# Patient Record
Sex: MALE | Race: ASIAN | NOT HISPANIC OR LATINO
[De-identification: names, ages, dates, MRNs, and addresses within clinical notes are randomized per-mention and may not be internally consistent; named-entity substitution may affect disease eponyms.]

---

## 2018-07-11 ENCOUNTER — APPOINTMENT (OUTPATIENT)
Dept: INTERNAL MEDICINE | Facility: CLINIC | Age: 43
End: 2018-07-11
Payer: COMMERCIAL

## 2018-07-11 VITALS
HEART RATE: 66 BPM | OXYGEN SATURATION: 99 % | RESPIRATION RATE: 17 BRPM | WEIGHT: 153 LBS | TEMPERATURE: 97.7 F | BODY MASS INDEX: 23.19 KG/M2 | HEIGHT: 68 IN | DIASTOLIC BLOOD PRESSURE: 80 MMHG | SYSTOLIC BLOOD PRESSURE: 128 MMHG

## 2018-07-11 DIAGNOSIS — Z82.61 FAMILY HISTORY OF ARTHRITIS: ICD-10-CM

## 2018-07-11 DIAGNOSIS — Z82.49 FAMILY HISTORY OF ISCHEMIC HEART DISEASE AND OTHER DISEASES OF THE CIRCULATORY SYSTEM: ICD-10-CM

## 2018-07-11 DIAGNOSIS — Z00.00 ENCOUNTER FOR GENERAL ADULT MEDICAL EXAMINATION W/OUT ABNORMAL FINDINGS: ICD-10-CM

## 2018-07-11 DIAGNOSIS — Z83.49 FAMILY HISTORY OF OTHER ENDOCRINE, NUTRITIONAL AND METABOLIC DISEASES: ICD-10-CM

## 2018-07-11 PROCEDURE — 99386 PREV VISIT NEW AGE 40-64: CPT

## 2018-07-11 RX ORDER — UBIDECARENONE/VIT E ACET 100MG-5
1000 CAPSULE ORAL
Refills: 0 | Status: ACTIVE | COMMUNITY

## 2018-07-11 RX ORDER — HYDROCHLOROTHIAZIDE 50 MG/1
50 TABLET ORAL
Refills: 0 | Status: ACTIVE | COMMUNITY

## 2018-07-13 LAB
25(OH)D3 SERPL-MCNC: 25.8 NG/ML
ALBUMIN SERPL ELPH-MCNC: 5.2 G/DL
ALP BLD-CCNC: 77 U/L
ALT SERPL-CCNC: 15 U/L
ANION GAP SERPL CALC-SCNC: 13 MMOL/L
APPEARANCE: CLEAR
AST SERPL-CCNC: 16 U/L
BASOPHILS # BLD AUTO: 0.02 K/UL
BASOPHILS NFR BLD AUTO: 0.3 %
BILIRUB SERPL-MCNC: 0.9 MG/DL
BILIRUBIN URINE: NEGATIVE
BLOOD URINE: NEGATIVE
BUN SERPL-MCNC: 11 MG/DL
CALCIUM SERPL-MCNC: 9.9 MG/DL
CHLORIDE SERPL-SCNC: 105 MMOL/L
CHOLEST SERPL-MCNC: 223 MG/DL
CHOLEST/HDLC SERPL: 4.1 RATIO
CO2 SERPL-SCNC: 26 MMOL/L
COLOR: YELLOW
CREAT SERPL-MCNC: 0.83 MG/DL
EOSINOPHIL # BLD AUTO: 0.05 K/UL
EOSINOPHIL NFR BLD AUTO: 0.7 %
GLUCOSE QUALITATIVE U: NEGATIVE MG/DL
GLUCOSE SERPL-MCNC: 85 MG/DL
HBA1C MFR BLD HPLC: 5.4 %
HCT VFR BLD CALC: 49.5 %
HDLC SERPL-MCNC: 55 MG/DL
HGB BLD-MCNC: 16.2 G/DL
IMM GRANULOCYTES NFR BLD AUTO: 0.3 %
KETONES URINE: NEGATIVE
LDLC SERPL CALC-MCNC: 152 MG/DL
LEUKOCYTE ESTERASE URINE: NEGATIVE
LYMPHOCYTES # BLD AUTO: 2.04 K/UL
LYMPHOCYTES NFR BLD AUTO: 30 %
MAN DIFF?: NORMAL
MCHC RBC-ENTMCNC: 29.1 PG
MCHC RBC-ENTMCNC: 32.7 GM/DL
MCV RBC AUTO: 89 FL
MONOCYTES # BLD AUTO: 0.38 K/UL
MONOCYTES NFR BLD AUTO: 5.6 %
NEUTROPHILS # BLD AUTO: 4.3 K/UL
NEUTROPHILS NFR BLD AUTO: 63.1 %
NITRITE URINE: NEGATIVE
PH URINE: 6.5
PLATELET # BLD AUTO: 227 K/UL
POTASSIUM SERPL-SCNC: 4.8 MMOL/L
PROT SERPL-MCNC: 7.7 G/DL
PROTEIN URINE: NEGATIVE MG/DL
RBC # BLD: 5.56 M/UL
RBC # FLD: 13.2 %
SODIUM SERPL-SCNC: 144 MMOL/L
SPECIFIC GRAVITY URINE: 1.01
TRIGL SERPL-MCNC: 81 MG/DL
TSH SERPL-ACNC: 1.04 UIU/ML
UROBILINOGEN URINE: NEGATIVE MG/DL
WBC # FLD AUTO: 6.81 K/UL

## 2018-08-02 ENCOUNTER — APPOINTMENT (OUTPATIENT)
Dept: UROLOGY | Facility: CLINIC | Age: 43
End: 2018-08-02

## 2018-10-17 ENCOUNTER — APPOINTMENT (OUTPATIENT)
Dept: UROLOGY | Facility: CLINIC | Age: 43
End: 2018-10-17
Payer: COMMERCIAL

## 2018-10-17 VITALS
SYSTOLIC BLOOD PRESSURE: 120 MMHG | HEART RATE: 99 BPM | HEIGHT: 68 IN | BODY MASS INDEX: 23.19 KG/M2 | WEIGHT: 153 LBS | RESPIRATION RATE: 16 BRPM | TEMPERATURE: 97.8 F | DIASTOLIC BLOOD PRESSURE: 70 MMHG | OXYGEN SATURATION: 97 %

## 2018-10-17 DIAGNOSIS — Z86.39 PERSONAL HISTORY OF OTHER ENDOCRINE, NUTRITIONAL AND METABOLIC DISEASE: ICD-10-CM

## 2018-10-17 DIAGNOSIS — Z00.00 ENCOUNTER FOR GENERAL ADULT MEDICAL EXAMINATION W/OUT ABNORMAL FINDINGS: ICD-10-CM

## 2018-10-17 DIAGNOSIS — N20.0 CALCULUS OF KIDNEY: ICD-10-CM

## 2018-10-17 PROCEDURE — 99204 OFFICE O/P NEW MOD 45 MIN: CPT

## 2018-10-17 RX ORDER — POTASSIUM CITRATE 15 MEQ/1
15 MEQ TABLET, EXTENDED RELEASE ORAL
Qty: 180 | Refills: 3 | Status: ACTIVE | COMMUNITY
Start: 1900-01-01 | End: 1900-01-01

## 2018-10-17 RX ORDER — POTASSIUM CITRATE 15 MEQ/1
15 MEQ TABLET, EXTENDED RELEASE ORAL
Refills: 0 | Status: ACTIVE | COMMUNITY

## 2019-10-16 ENCOUNTER — APPOINTMENT (OUTPATIENT)
Dept: UROLOGY | Facility: CLINIC | Age: 44
End: 2019-10-16

## 2022-03-18 ENCOUNTER — EMERGENCY (EMERGENCY)
Facility: HOSPITAL | Age: 47
LOS: 1 days | Discharge: HOME | End: 2022-03-18
Attending: STUDENT IN AN ORGANIZED HEALTH CARE EDUCATION/TRAINING PROGRAM | Admitting: STUDENT IN AN ORGANIZED HEALTH CARE EDUCATION/TRAINING PROGRAM
Payer: COMMERCIAL

## 2022-03-18 VITALS
OXYGEN SATURATION: 98 % | TEMPERATURE: 98 F | WEIGHT: 151.9 LBS | SYSTOLIC BLOOD PRESSURE: 168 MMHG | HEART RATE: 102 BPM | RESPIRATION RATE: 16 BRPM | DIASTOLIC BLOOD PRESSURE: 95 MMHG

## 2022-03-18 VITALS
SYSTOLIC BLOOD PRESSURE: 144 MMHG | RESPIRATION RATE: 16 BRPM | HEART RATE: 89 BPM | OXYGEN SATURATION: 99 % | TEMPERATURE: 98 F | DIASTOLIC BLOOD PRESSURE: 88 MMHG

## 2022-03-18 LAB
ALBUMIN SERPL ELPH-MCNC: 4.5 G/DL — SIGNIFICANT CHANGE UP (ref 3.3–5)
ALP SERPL-CCNC: 93 U/L — SIGNIFICANT CHANGE UP (ref 40–120)
ALT FLD-CCNC: 34 U/L — SIGNIFICANT CHANGE UP (ref 12–78)
ANION GAP SERPL CALC-SCNC: 8 MMOL/L — SIGNIFICANT CHANGE UP (ref 5–17)
APTT BLD: 32.5 SEC — SIGNIFICANT CHANGE UP (ref 27.5–35.5)
AST SERPL-CCNC: 19 U/L — SIGNIFICANT CHANGE UP (ref 15–37)
BASOPHILS # BLD AUTO: 0.06 K/UL — SIGNIFICANT CHANGE UP (ref 0–0.2)
BASOPHILS NFR BLD AUTO: 0.7 % — SIGNIFICANT CHANGE UP (ref 0–2)
BILIRUB SERPL-MCNC: 0.4 MG/DL — SIGNIFICANT CHANGE UP (ref 0.2–1.2)
BUN SERPL-MCNC: 12 MG/DL — SIGNIFICANT CHANGE UP (ref 7–23)
CALCIUM SERPL-MCNC: 10.3 MG/DL — HIGH (ref 8.5–10.1)
CHLORIDE SERPL-SCNC: 101 MMOL/L — SIGNIFICANT CHANGE UP (ref 96–108)
CO2 SERPL-SCNC: 30 MMOL/L — SIGNIFICANT CHANGE UP (ref 22–31)
CREAT SERPL-MCNC: 1 MG/DL — SIGNIFICANT CHANGE UP (ref 0.5–1.3)
EGFR: 95 ML/MIN/1.73M2 — SIGNIFICANT CHANGE UP
EOSINOPHIL # BLD AUTO: 0.08 K/UL — SIGNIFICANT CHANGE UP (ref 0–0.5)
EOSINOPHIL NFR BLD AUTO: 1 % — SIGNIFICANT CHANGE UP (ref 0–6)
GLUCOSE SERPL-MCNC: 94 MG/DL — SIGNIFICANT CHANGE UP (ref 70–99)
HCT VFR BLD CALC: 49.1 % — SIGNIFICANT CHANGE UP (ref 39–50)
HGB BLD-MCNC: 17.6 G/DL — HIGH (ref 13–17)
IMM GRANULOCYTES NFR BLD AUTO: 0.7 % — SIGNIFICANT CHANGE UP (ref 0–1.5)
INR BLD: 0.93 RATIO — SIGNIFICANT CHANGE UP (ref 0.88–1.16)
LYMPHOCYTES # BLD AUTO: 3.14 K/UL — SIGNIFICANT CHANGE UP (ref 1–3.3)
LYMPHOCYTES # BLD AUTO: 37.3 % — SIGNIFICANT CHANGE UP (ref 13–44)
MCHC RBC-ENTMCNC: 30.8 PG — SIGNIFICANT CHANGE UP (ref 27–34)
MCHC RBC-ENTMCNC: 35.8 GM/DL — SIGNIFICANT CHANGE UP (ref 32–36)
MCV RBC AUTO: 86 FL — SIGNIFICANT CHANGE UP (ref 80–100)
MONOCYTES # BLD AUTO: 0.58 K/UL — SIGNIFICANT CHANGE UP (ref 0–0.9)
MONOCYTES NFR BLD AUTO: 6.9 % — SIGNIFICANT CHANGE UP (ref 2–14)
NEUTROPHILS # BLD AUTO: 4.49 K/UL — SIGNIFICANT CHANGE UP (ref 1.8–7.4)
NEUTROPHILS NFR BLD AUTO: 53.4 % — SIGNIFICANT CHANGE UP (ref 43–77)
NRBC # BLD: 0 /100 WBCS — SIGNIFICANT CHANGE UP (ref 0–0)
PLATELET # BLD AUTO: 287 K/UL — SIGNIFICANT CHANGE UP (ref 150–400)
POTASSIUM SERPL-MCNC: 3.6 MMOL/L — SIGNIFICANT CHANGE UP (ref 3.5–5.3)
POTASSIUM SERPL-SCNC: 3.6 MMOL/L — SIGNIFICANT CHANGE UP (ref 3.5–5.3)
PROT SERPL-MCNC: 7.9 G/DL — SIGNIFICANT CHANGE UP (ref 6–8.3)
PROTHROM AB SERPL-ACNC: 10.9 SEC — SIGNIFICANT CHANGE UP (ref 10.5–13.4)
RBC # BLD: 5.71 M/UL — SIGNIFICANT CHANGE UP (ref 4.2–5.8)
RBC # FLD: 12.7 % — SIGNIFICANT CHANGE UP (ref 10.3–14.5)
SODIUM SERPL-SCNC: 139 MMOL/L — SIGNIFICANT CHANGE UP (ref 135–145)
WBC # BLD: 8.41 K/UL — SIGNIFICANT CHANGE UP (ref 3.8–10.5)
WBC # FLD AUTO: 8.41 K/UL — SIGNIFICANT CHANGE UP (ref 3.8–10.5)

## 2022-03-18 PROCEDURE — 93010 ELECTROCARDIOGRAM REPORT: CPT

## 2022-03-18 PROCEDURE — 96368 THER/DIAG CONCURRENT INF: CPT

## 2022-03-18 PROCEDURE — 85025 COMPLETE CBC W/AUTO DIFF WBC: CPT

## 2022-03-18 PROCEDURE — 85730 THROMBOPLASTIN TIME PARTIAL: CPT

## 2022-03-18 PROCEDURE — 96375 TX/PRO/DX INJ NEW DRUG ADDON: CPT | Mod: XU

## 2022-03-18 PROCEDURE — 70496 CT ANGIOGRAPHY HEAD: CPT | Mod: MA

## 2022-03-18 PROCEDURE — 96365 THER/PROPH/DIAG IV INF INIT: CPT | Mod: XU

## 2022-03-18 PROCEDURE — 99284 EMERGENCY DEPT VISIT MOD MDM: CPT

## 2022-03-18 PROCEDURE — 96361 HYDRATE IV INFUSION ADD-ON: CPT

## 2022-03-18 PROCEDURE — 70496 CT ANGIOGRAPHY HEAD: CPT | Mod: 26,MA

## 2022-03-18 PROCEDURE — 80053 COMPREHEN METABOLIC PANEL: CPT

## 2022-03-18 PROCEDURE — 36415 COLL VENOUS BLD VENIPUNCTURE: CPT

## 2022-03-18 PROCEDURE — 93005 ELECTROCARDIOGRAM TRACING: CPT

## 2022-03-18 PROCEDURE — 70498 CT ANGIOGRAPHY NECK: CPT | Mod: 26,MA

## 2022-03-18 PROCEDURE — 70498 CT ANGIOGRAPHY NECK: CPT | Mod: MA

## 2022-03-18 PROCEDURE — 70450 CT HEAD/BRAIN W/O DYE: CPT | Mod: MA

## 2022-03-18 PROCEDURE — 72125 CT NECK SPINE W/O DYE: CPT | Mod: MA

## 2022-03-18 PROCEDURE — 99285 EMERGENCY DEPT VISIT HI MDM: CPT | Mod: 25

## 2022-03-18 PROCEDURE — 85610 PROTHROMBIN TIME: CPT

## 2022-03-18 PROCEDURE — 72125 CT NECK SPINE W/O DYE: CPT | Mod: 26,MA

## 2022-03-18 RX ORDER — METOCLOPRAMIDE HCL 10 MG
10 TABLET ORAL ONCE
Refills: 0 | Status: COMPLETED | OUTPATIENT
Start: 2022-03-18 | End: 2022-03-18

## 2022-03-18 RX ORDER — KETOROLAC TROMETHAMINE 30 MG/ML
15 SYRINGE (ML) INJECTION ONCE
Refills: 0 | Status: DISCONTINUED | OUTPATIENT
Start: 2022-03-18 | End: 2022-03-18

## 2022-03-18 RX ORDER — SODIUM CHLORIDE 9 MG/ML
1000 INJECTION INTRAMUSCULAR; INTRAVENOUS; SUBCUTANEOUS ONCE
Refills: 0 | Status: COMPLETED | OUTPATIENT
Start: 2022-03-18 | End: 2022-03-18

## 2022-03-18 RX ORDER — ACETAMINOPHEN 500 MG
1000 TABLET ORAL ONCE
Refills: 0 | Status: COMPLETED | OUTPATIENT
Start: 2022-03-18 | End: 2022-03-18

## 2022-03-18 RX ADMIN — Medication 10 MILLIGRAM(S): at 19:00

## 2022-03-18 RX ADMIN — Medication 1000 MILLIGRAM(S): at 19:23

## 2022-03-18 RX ADMIN — SODIUM CHLORIDE 1000 MILLILITER(S): 9 INJECTION INTRAMUSCULAR; INTRAVENOUS; SUBCUTANEOUS at 17:30

## 2022-03-18 RX ADMIN — Medication 15 MILLIGRAM(S): at 20:10

## 2022-03-18 RX ADMIN — SODIUM CHLORIDE 1000 MILLILITER(S): 9 INJECTION INTRAMUSCULAR; INTRAVENOUS; SUBCUTANEOUS at 19:00

## 2022-03-18 RX ADMIN — Medication 104 MILLIGRAM(S): at 18:23

## 2022-03-18 RX ADMIN — Medication 400 MILLIGRAM(S): at 18:23

## 2022-03-18 NOTE — ED ADULT NURSE NOTE - NEURO MENTATION
History of Present Illness


General


Chief Complaint:  Dyspnea/Respdistress


Source:  Patient





Present Illness


HPI


Patient presents with complaints of shortness of breath


She reports previous history of COPD





Over the past 7 days she has been worsening


Even with short exertion


She feels increasingly short of breath denies any vomiting denies any diarrhea





Denies any recent URI


Patient has found herself using her albuterol machine more than usual and is 

any neck pain or photophobia denies any chest pain


Allergies:  


Coded Allergies:  


     No Known Allergies (Unverified , 3/6/18)





Patient History


Past Medical History:  see triage record


Pertinent Family History:  none


Reviewed Nursing Documentation:  PMH: Agreed, PSxH: Agreed





Nursing Documentation-PMH


Hx COPD:  Yes


Hx Diabetes:  Yes





Review of Systems


All Other Systems:  negative except mentioned in HPI





Physical Exam





Vital Signs








  Date Time  Temp Pulse Resp B/P (MAP) Pulse Ox O2 Delivery O2 Flow Rate FiO2


 


3/6/18 05:34 98.0 94 24 197/115 97 Simple Mask 10.0 





 98.1       








Sp02 EP Interpretation:  reviewed, normal


General Appearance:  mild distress - Appears short of breath


Head:  normocephalic, atraumatic


Eyes:  bilateral eye PERRL, bilateral eye EOMI


ENT:  hearing grossly normal, normal pharynx, TMs + canals normal, uvula midline


Neck:  full range of motion, supple, no meningismus, no bony tend


Respiratory:  no rhonchi, no respiratory distress, no accessory muscle use, 

other - Patient has crackles and fine wheezing bilaterally, she is also 

tachypneic


Cardiovascular #1:  normal peripheral pulses, regular rate, rhythm, no edema, 

no gallop, no JVD, no murmur


Gastrointestinal:  normal bowel sounds, non tender, soft, no mass, no 

organomegaly, non-distended, no guarding, no hernia, no pulsatile mass, no 

rebound


Genitourinary:  no CVA tenderness


Musculoskeletal:  normal inspection


Neurologic:  oriented x3, responsive, CNs III-XII nml as tested, motor strength/

tone normal, sensory intact


Psychiatric:  mood/affect normal


Skin:  normal color, no rash, warm/dry, palpation normal


Lymphatic:  normal inspection, no adenopathy





Procedures


Critical Care Time


Critical Care Time


40 minutes for initial critical presentation


Shortness of breath and respiratory distress requiring acute intervention


Concern for respiratory failure not including any procedural time





Medical Decision Making


Diagnostic Impression:  


 Primary Impression:  


 COPD exacerbation


ER Course


Patient is a fairly complex patient with multiple differential to consideration 

including but not limited to cardiac cardiopulmonary and vascular emergencies





Patient initiated emergently on breathing treatments


Steroids and magnesium





Patient continues to do better in the emergency room


However will require further inpatient care





Labs








Test


  3/6/18


05:50


 


White Blood Count


  9.7 K/UL


(4.8-10.8)


 


Red Blood Count


  4.93 M/UL


(4.20-5.40)


 


Hemoglobin


  14.4 G/DL


(12.0-16.0)


 


Hematocrit


  43.6 %


(37.0-47.0)


 


Mean Corpuscular Volume 88 FL (80-99) 


 


Mean Corpuscular Hemoglobin


  29.2 PG


(27.0-31.0)


 


Mean Corpuscular Hemoglobin


Concent 33.1 G/DL


(32.0-36.0)


 


Red Cell Distribution Width


  14.2 %


(11.6-14.8)


 


Platelet Count


  268 K/UL


(150-450)


 


Mean Platelet Volume


  7.6 FL


(6.5-10.1)


 


Neutrophils (%) (Auto)


  64.4 %


(45.0-75.0)


 


Lymphocytes (%) (Auto)


  17.6 %


(20.0-45.0)


 


Monocytes (%) (Auto)


  5.6 %


(1.0-10.0)


 


Eosinophils (%) (Auto)


  11.5 %


(0.0-3.0)


 


Basophils (%) (Auto)


  1.0 %


(0.0-2.0)


 


Sodium Level


  141 MMOL/L


(136-145)


 


Potassium Level


  4.0 MMOL/L


(3.5-5.1)


 


Chloride Level


  104 MMOL/L


()


 


Carbon Dioxide Level


  27 MMOL/L


(21-32)


 


Anion Gap


  10 mmol/L


(5-15)


 


Blood Urea Nitrogen


  19 mg/dL


(7-18)


 


Creatinine


  1.1 MG/DL


(0.55-1.30)


 


Estimat Glomerular Filtration


Rate > 60 mL/min


(>60)


 


Glucose Level


  120 MG/DL


()


 


Calcium Level


  9.3 MG/DL


(8.5-10.1)


 


Total Bilirubin


  0.2 MG/DL


(0.2-1.0)


 


Aspartate Amino Transf


(AST/SGOT) 30 U/L (15-37) 


 


 


Alanine Aminotransferase


(ALT/SGPT) 34 U/L (12-78) 


 


 


Alkaline Phosphatase


  138 U/L


()


 


Total Creatine Kinase


  427 U/L


()


 


Creatine Kinase MB


  10.3 NG/ML


(0.0-3.6)


 


Creatine Kinase MB Relative


Index 2.4 


 


 


Troponin I


  0.075 ng/mL


(0.000-0.056)


 


Pro-B-Type Natriuretic Peptide


  754 pg/mL


(0-125)


 


Total Protein


  7.5 G/DL


(6.4-8.2)


 


Albumin


  3.3 G/DL


(3.4-5.0)


 


Globulin 4.2 g/dL 


 


Albumin/Globulin Ratio 0.8 (1.0-2.7) 


 


Lipase


  200 U/L


()








Rhythm Strip Diag. Results


EP Interpretation:  yes


Rate:  89


Rhythm:  NSR, no PVC's, no ectopy





Chest X-Ray Diagnostic Results


Chest X-Ray Diagnostic Results :  


   Chest X-Ray Ordered:  Yes


   # of Views/Limited/Complete:  1 View


   Indication:  Shortness of Breath


   EP Interpretation:  Yes


   Interpretation:  no consolidation, no effusion, no pneumothorax, no acute 

cardiopulmonary disease


   Impression:  No acute disease


   Electronically Signed by:  Yaw Mendez DO





Last Vital Signs








  Date Time  Temp Pulse Resp B/P (MAP) Pulse Ox O2 Delivery O2 Flow Rate FiO2


 


3/6/18 05:34 98.0 94 24 197/115 97 Simple Mask 10.0 





 98.1       








Status:  improved


Disposition:  ADMITTED AS INPATIENT


Condition:  Serious











YAW MENDEZ D.O. Mar 6, 2018 05:45 normal

## 2022-03-18 NOTE — ED ADULT TRIAGE NOTE - INTERNATIONAL TRAVEL
AFTER VISIT SUMMARY (AVS):    At today's visit we thoroughly discussed current symptoms, brain MRI findings, necessary evaluation, and the plan, which includes:  Orders Placed This Encounter   Procedures     MR Cervical Spine w/o Contrast     MR Brain w/o & w Contrast     No new medications.    Next follow-up appointment is in the next 2-4 weeks or earlier if needed.    Please do not hesitate to call me with any questions or concerns.    Thanks.    
No

## 2022-03-18 NOTE — ED PROVIDER NOTE - CARE PROVIDER_API CALL
Joanne Barros  NEUROLOGY  [address] [place] [address]  Phone: [phone]  Fax: [phone]  Follow Up Time:

## 2022-03-18 NOTE — ED PROVIDER NOTE - PROGRESS NOTE DETAILS
Pt reports improvement and is requesting dc. Workup unremarkable. Cleared by toxicology. Discussed the results of all diagnostic testing in ED and copies of all reports given.   Pt was given an opportunity to have all questions answered to satisfaction.  Discussed the importance of prompt, close medical follow-up. ED return precautions discussed at length.  Pt verbalizes agreement and understanding of plan and ED return precautions. Pt well appearing, stable for DC home. No emergent concerns at this time.

## 2022-03-18 NOTE — CONSULT NOTE ADULT - ASSESSMENT
·	Per detail shared by patient, symptoms do not seem related to LA toxicity.   ·	Would suggest to manage symptoms accordingly.   ·	Would require a F/U with primary pain doctor for further management.     Thank you for the consult.

## 2022-03-18 NOTE — ED PROVIDER NOTE - NSFOLLOWUPINSTRUCTIONS_ED_ALL_ED_FT
1) Follow up with your pain management doctor and neurology within 1-3 days.  2) Return to the ED immediately for new or worsening symptoms as we discussed.               ACUTE HEADACHE - General Information           Acute Headache    WHAT YOU NEED TO KNOW:    What is an acute headache? An acute headache is pain or discomfort that may start suddenly and get worse quickly. You may have an acute headache only when you feel stress or eat certain foods. Other acute headache pain can happen every day, and sometimes several times a day.     What are the most common types of acute headache?   •Tension headache is the most common type of headache. These headaches typically occur in the late afternoon and go away by evening. The pain is usually mild or moderate. You may have problems tolerating bright light or loud noise. The pain is usually across the forehead or in the back of the head, often only on one side. These headaches may occur every day.       •Migraine headaches cause moderate or severe pain. The headache generally lasts from 1 to 3 days and tends to come back. Pain is usually on only one side, but it may change sides. Migraines often occur in the temple, the back of the head, or behind the eye. The pain may throb or be sharp and steady.      •A migraine with aura means you see or feel something before a migraine. You may see a small spot surrounded by bright zigzag lines. Other signs or symptoms may follow the aura.       •Cluster headache pain is usually only on one side. It often causes severe pain, and can last for 30 minutes to 2 hours. These headaches may occur 1 or 2 times each day, more often at night. The pain may wake you.       What causes acute headaches? The cause of your headache may not be known. The following can trigger a headache:   •Stress or tension, hours or even days after stressful events      •Fatigue, a lack of sleep or changes in your usual sleep pattern, or a nap during the day      •Menstruation, especially after pregnancy, or use of birth control pills or hormone replacement therapy      •Food such as cured meats, artificial sweeteners, alcohol, dark chocolate, and MSG       •Suddenly not having caffeine if you usually have larger amounts      •A medical problem, such as an infection, tooth pain, neck or sinus pain, thyroid problems, or a tumor      •A head injury      How is the type of acute headache diagnosed and treated? Your healthcare provider will ask you to describe your pain and rate it on a scale from 1 to 10. Tell the provider how often you have headaches and how long they last. Also describe any other symptoms you have along with headaches, such as dizziness or blurred vision. You may need tests including a CT scan to make sure there is not a leak in any blood vessels.   •Medicines may be given to manage or prevent headaches. The medicine will depend on the type of acute headache you have. Do not wait until the pain is severe before you take your medicine. You may be able to take over-the-counter pain medicines as needed. Examples include NSAIDs and acetaminophen. Ask your healthcare provider which medicine is right for you. Ask how much to take and when to take it. Follow directions. These medicines can cause stomach bleeding or kidney or liver damage if not taken correctly.      •Biofeedback may be used to help you manage stress. Electrodes (wires) are placed on your body and attached to a monitor. You will learn how to change stress reactions. For example, you learn to slow your heart rate when you become upset.       •Cognitive behavior therapy, or stress management, may be used with other therapies to prevent headaches.      What can I do to manage my symptoms?   •Apply heat or ice on the headache area. Use a heat or ice pack. For an ice pack, you can also put crushed ice in a plastic bag. Cover the pack or bag with a towel before you apply it to your skin. Ice and heat both help decrease pain, and heat also helps decrease muscle spasms. Apply heat for 20 to 30 minutes every 2 hours. Apply ice for 15 to 20 minutes every hour. Apply heat or ice for as long and for as many days as directed. You may alternate heat and ice.      •Relax your muscles. Lie down in a comfortable position and close your eyes. Relax your muscles slowly. Start at your toes and work your way up your body.      •Keep a record of your headaches. Write down when your headaches start and stop. Include your symptoms and what you were doing when the headache began. Record what you ate or drank for 24 hours before the headache started. Describe the pain and where it hurts. Keep track of what you did to treat your headache and if it worked.       What can I do to prevent an acute headache?   •Avoid anything that triggers an acute headache. Examples include exposure to chemicals, going to high altitude, or not getting enough sleep. Create a regular sleep routine. Go to sleep at the same time and wake up at the same time each day. Do not use electronic devices before bedtime. These may trigger a headache or prevent you from sleeping well.      •Do not smoke. Nicotine and other chemicals in cigarettes and cigars can trigger an acute headache or make it worse. Ask your healthcare provider for information if you currently smoke and need help to quit. E-cigarettes or smokeless tobacco still contain nicotine. Talk to your healthcare provider before you use these products.       •Limit alcohol as directed. Alcohol can trigger an acute headache or make it worse. If you have cluster headaches, do not drink alcohol during an episode. For other types of headaches, ask your healthcare provider if it is safe for you to drink alcohol. Ask how much is safe for you to drink, and how often.      •Exercise as directed. Exercise can reduce tension and help with headache pain. Aim for 30 minutes of physical activity on most days of the week. Your healthcare provider can help you create an exercise plan.      •Eat a variety of healthy foods. Healthy foods include fruits, vegetables, low-fat dairy products, lean meats, fish, whole grains, and cooked beans. Your healthcare provider or dietitian can help you create meals plans if you need to avoid foods that trigger headaches.      When should I seek immediate care?   •You have severe pain.      •You have numbness or weakness on one side of your face or body.      •You have a headache that occurs after a blow to the head, a fall, or other trauma.       •You have a headache, are forgetful or confused, or have trouble speaking.      •You have a headache, stiff neck, and a fever.      When should I call my doctor?   •You have a constant headache and are vomiting.      •You have a headache each day that does not get better, even after treatment.      •You have changes in your headaches, or new symptoms that occur when you have a headache.      •You have questions or concerns about your condition or care.      CARE AGREEMENT:    You have the right to help plan your care. Learn about your health condition and how it may be treated. Discuss treatment options with your healthcare providers to decide what care you want to receive. You always have the right to refuse treatment.        © Copyright [employer] 2022           back to top                          © Copyright [employer] 2022

## 2022-03-18 NOTE — ED ADULT NURSE REASSESSMENT NOTE - NS ED NURSE REASSESS COMMENT FT1
45 y/o male received from previous RN. Alert and oriented x4. C/o headache and dizziness after getting an epidural injection on Monday. Neuro intact. No facial droop noted. Pt speaking in clear full sentences. Pending CT results. Will continue to monitor.

## 2022-03-18 NOTE — CONSULT NOTE ADULT - SUBJECTIVE AND OBJECTIVE BOX
Please schedule sleep study and renal ultrasound, come back and follow-up in 4wks. Please schedule appointment with gastroenterology for screening colonoscopy. MEDICAL TOXICOLOGY CONSULT    HPI: 46 Yr male h/o cervical disc herniation received Epidural injection 4 days ago.   Patient presents with complaints of BL ear ringing, BL vision blurring, dizziness, headache. No light headedness, no numbness/myoclonus of face, no tongue fasciulations, metallic taste, sensorineural deafness. Per patient, pain is steadily returining to cervical spine, he is taking OTC Motrin, Tylenol PRN and Percocet at night PRN. No salicylates. Unable to contact pain doctor at [facility] pain [place], [place] at this time, per patient recieved fentanyl and a BZD, followed by epidural containing steroids. His pain doctor is aware and was concerned for any cervical spine abnormality post intervention so sent him to ED. Tox consulted for possible LA toxicity.    ONSET / TIME of exposure(s): 4 days ago     QUANTITY of exposure(s): Unavailable     ROUTE of exposure: Epidural     PAST MEDICAL & SURGICAL HISTORY:  Kidney stones    REVIEW OF SYSTEMS:   Negative except  HPI above    Vital Signs Last 24 Hrs  T(C): 36.8 (18 Mar 2022 16:50), Max: 36.8 (18 Mar 2022 16:50)  T(F): 98.3 (18 Mar 2022 16:50), Max: 98.3 (18 Mar 2022 16:50)  HR: 102 (18 Mar 2022 16:50) (102 - 102)  BP: 168/95 (18 Mar 2022 16:50) (168/95 - 168/95)  RR: 16 (18 Mar 2022 16:50) (16 - 16)  SpO2: 98% (18 Mar 2022 16:50) (98% - 98%)    SIGNIFICANT LABORATORY STUDIES:                        17.6   8.41  )-----------( 287      ( 18 Mar 2022 18:13 )             49.1     03-18    139  |  101  |  12  ----------------------------<  94  3.6   |  30  |  1.00    Ca    10.3<H>      18 Mar 2022 18:13    TPro  7.9  /  Alb  4.5  /  TBili  0.4  /  DBili  x   /  AST  19  /  ALT  34  /  AlkPhos  93  03-18      PT/INR - ( 18 Mar 2022 18:13 )   PT: 10.9 sec;   INR: 0.93 ratio    PTT - ( 18 Mar 2022 18:13 )  PTT:32.5 sec

## 2022-03-18 NOTE — ED PROVIDER NOTE - NS ED ATTENDING STATEMENT MOD
This was a shared visit with the RED. I reviewed and verified the documentation and independently performed the documented:

## 2022-03-18 NOTE — ED PROVIDER NOTE - CLINICAL SUMMARY MEDICAL DECISION MAKING FREE TEXT BOX
46 M here complaining of dizziness, tinnitus, headache starting shortly after reported cervical spine epidural steroid injection. symptoms not improving so Dr. Amaral [phone] told him to come to the ED for evaluation. concern for vascular injury v vertiginous syndrome v central cause. Infectious etiology less likely given lack of associated symptoms. Health Care Proxy (HCP)/Living Will

## 2022-03-18 NOTE — ED PROVIDER NOTE - ATTENDING CONTRIBUTION TO CARE
This was a shared visit with RED. I reviewed and verified the documentation and independently performed the documented MDM.

## 2022-03-18 NOTE — ED PROVIDER NOTE - PATIENT PORTAL LINK FT
You can access the FollowMyHealth Patient Portal offered by [facility] by registering at the following website: [email or web address]. By joining [employer]’s FollowMyHealth portal, you will also be able to view your health information using other applications (apps) compatible with our system.

## 2022-07-05 ENCOUNTER — APPOINTMENT (OUTPATIENT)
Dept: ORTHOPEDIC SURGERY | Facility: CLINIC | Age: 47
End: 2022-07-05

## 2022-07-05 VITALS — WEIGHT: 150 LBS | HEIGHT: 68 IN | BODY MASS INDEX: 22.73 KG/M2

## 2022-07-05 DIAGNOSIS — M79.641 PAIN IN RIGHT HAND: ICD-10-CM

## 2022-07-05 DIAGNOSIS — M79.642 PAIN IN RIGHT HAND: ICD-10-CM

## 2022-07-05 PROBLEM — N20.0 CALCULUS OF KIDNEY: Chronic | Status: ACTIVE | Noted: 2022-03-18

## 2022-07-05 PROCEDURE — 99203 OFFICE O/P NEW LOW 30 MIN: CPT

## 2022-07-05 PROCEDURE — 73130 X-RAY EXAM OF HAND: CPT | Mod: LT

## 2022-07-05 PROCEDURE — L3908: CPT | Mod: LT

## 2022-07-05 PROCEDURE — 99072 ADDL SUPL MATRL&STAF TM PHE: CPT

## 2022-07-05 NOTE — HISTORY OF PRESENT ILLNESS
[7] : 7 [4] : 4 [Dull/Aching] : dull/aching [Localized] : localized [Sharp] : sharp [Not working due to injury] : Work status: not working due to injury [de-identified] : 46 year old male presents for b/l hand pain for the past 3-4 years. No specific injury bt his pain first started when he was at work. L>R.  Not working due to other medical conditions.  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

## 2022-07-05 NOTE — DISCUSSION/SUMMARY
[de-identified] : Splint at night.  Warm compresses and NSAIDs as needed for CMC and trigger fingers.

## 2022-07-05 NOTE — PHYSICAL EXAM
[Right] : right hand [3rd] : 3rd [A1-Pulley] : A1-pulley [Left] : left hand [1st] : 1st [CMC Joint] : CMC joint [Bilateral] : hand bilaterally [There are no fractures, subluxations or dislocations. No significant abnormalities are seen] : There are no fractures, subluxations or dislocations. No significant abnormalities are seen [No acute displaced fracture or dislocation] : No acute displaced fracture or dislocation [] : no erythema [de-identified] : first CMC tenderness

## 2022-08-02 ENCOUNTER — APPOINTMENT (OUTPATIENT)
Dept: ORTHOPEDIC SURGERY | Facility: CLINIC | Age: 47
End: 2022-08-02

## 2022-08-02 VITALS — BODY MASS INDEX: 22.73 KG/M2 | WEIGHT: 150 LBS | HEIGHT: 68 IN

## 2022-08-02 PROCEDURE — 99072 ADDL SUPL MATRL&STAF TM PHE: CPT

## 2022-08-02 PROCEDURE — 99214 OFFICE O/P EST MOD 30 MIN: CPT

## 2022-08-02 NOTE — HISTORY OF PRESENT ILLNESS
[Work related] : work related [8] : 8 [5] : 5 [Dull/Aching] : dull/aching [Sharp] : sharp [Throbbing] : throbbing [Constant] : constant [Household chores] : household chores [Work] : work [Not working due to injury] : Work status: not working due to injury [de-identified] : 46 year old male followed for b/l CMC arthritis, b/l CTS, b/l middle trigger fingers. States his CTS symptoms are most bothersome at this time. L>R. Has been nightly splinting, which he states did not improve his symptoms.  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

## 2022-08-02 NOTE — PHYSICAL EXAM
[Right] : right hand [3rd] : 3rd [A1-Pulley] : A1-pulley [Left] : left hand [1st] : 1st [CMC Joint] : CMC joint [Bilateral] : hand bilaterally [There are no fractures, subluxations or dislocations. No significant abnormalities are seen] : There are no fractures, subluxations or dislocations. No significant abnormalities are seen [No acute displaced fracture or dislocation] : No acute displaced fracture or dislocation [] : no erythema [de-identified] : first CMC tenderness

## 2023-06-20 ENCOUNTER — APPOINTMENT (OUTPATIENT)
Dept: ORTHOPEDIC SURGERY | Facility: CLINIC | Age: 48
End: 2023-06-20
Payer: OTHER MISCELLANEOUS

## 2023-06-20 VITALS — WEIGHT: 155 LBS | BODY MASS INDEX: 23.49 KG/M2 | HEIGHT: 68 IN

## 2023-06-20 DIAGNOSIS — M65.9 SYNOVITIS AND TENOSYNOVITIS, UNSPECIFIED: ICD-10-CM

## 2023-06-20 DIAGNOSIS — M65.331 TRIGGER FINGER, RIGHT MIDDLE FINGER: ICD-10-CM

## 2023-06-20 DIAGNOSIS — M65.332 TRIGGER FINGER, LEFT MIDDLE FINGER: ICD-10-CM

## 2023-06-20 PROCEDURE — 99214 OFFICE O/P EST MOD 30 MIN: CPT

## 2023-06-20 NOTE — PHYSICAL EXAM
[3rd] : 3rd [A1-Pulley] : A1-pulley [There are no fractures, subluxations or dislocations. No significant abnormalities are seen] : There are no fractures, subluxations or dislocations. No significant abnormalities are seen [No acute displaced fracture or dislocation] : No acute displaced fracture or dislocation [Bilateral] : hand bilaterally [] : no erythema [de-identified] : first CMC tenderness

## 2023-06-20 NOTE — HISTORY OF PRESENT ILLNESS
[Work related] : work related [8] : 8 [3] : 3 [Dull/Aching] : dull/aching [Localized] : localized [Sharp] : sharp [Shooting] : shooting [Throbbing] : throbbing [Constant] : constant [Household chores] : household chores [Work] : work [Meds] : meds [Not working due to injury] : Work status: not working due to injury [de-identified] : 46 year old right handed male followed for b/l CTS, b/l CMC synovitis and c/l middle trigger fingers. Patient states Left hand is the same since the last visit and the right hands pain has increased, he noticed he has weakness. He states he was given wrist splint which he noticed it did not improve his pain in his hands. He has MRI results for right hand and right wrist.  He is continuing to have numbness in the hands, but sleep is not affected. \par  [] : no [FreeTextEntry1] : b/l hands [FreeTextEntry3] : 06/14/2019 [FreeTextEntry6] : Numbess in the fingers for the R hand.  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

## 2023-06-20 NOTE — DATA REVIEWED
[Wrist] : wrist [I reviewed the films/CD and agree] : I reviewed the films/CD and agree [MRI] : MRI [Right] : of the right [Hand] : hand [FreeTextEntry1] : MRI of RIGHT hand and wrist. Scanned into chart.

## 2023-06-20 NOTE — DISCUSSION/SUMMARY
[de-identified] : Discussed the nature of the diagnosis and risk and benefits of different modalities of treatment.\par He reports generalized body pain in many locations. \par Recommended consultation with Rheumatology. \par CMC is most bothersome.\par Deferred CSI and will continue with conservative management. \par EMG has been denied by WC. \par Splinting has failed to resolve his symptoms. He is still with paresthesias in the distribution in the median nerve. EMG is indicated. \par Resubmitted for EMG.\par

## 2023-08-07 ENCOUNTER — APPOINTMENT (OUTPATIENT)
Dept: NEUROLOGY | Facility: CLINIC | Age: 48
End: 2023-08-07
Payer: OTHER MISCELLANEOUS

## 2023-08-07 PROCEDURE — 95912 NRV CNDJ TEST 11-12 STUDIES: CPT

## 2023-08-07 PROCEDURE — 95886 MUSC TEST DONE W/N TEST COMP: CPT

## 2023-08-08 ENCOUNTER — APPOINTMENT (OUTPATIENT)
Dept: ORTHOPEDIC SURGERY | Facility: CLINIC | Age: 48
End: 2023-08-08
Payer: OTHER MISCELLANEOUS

## 2023-08-08 VITALS — WEIGHT: 155 LBS | BODY MASS INDEX: 23.49 KG/M2 | HEIGHT: 68 IN

## 2023-08-08 DIAGNOSIS — G56.02 CARPAL TUNNEL SYNDROME, LEFT UPPER LIMB: ICD-10-CM

## 2023-08-08 DIAGNOSIS — G56.01 CARPAL TUNNEL SYNDROME, RIGHT UPPER LIMB: ICD-10-CM

## 2023-08-08 PROCEDURE — 99214 OFFICE O/P EST MOD 30 MIN: CPT

## 2023-08-08 NOTE — DISCUSSION/SUMMARY
[de-identified] : Discussed the nature of the diagnosis and risk and benefits of different modalities of treatment. Recommended consultation with neck specialist. Offered CSI again for diagnostic purposes.  Deferred CSI, due prior experiences, and will continue with conservative management.  Return to bracing for 2 weeks.  Referred to Dr. Worthy for spine consult.

## 2023-08-08 NOTE — PHYSICAL EXAM
[3rd] : 3rd [A1-Pulley] : A1-pulley [Bilateral] : hand bilaterally [There are no fractures, subluxations or dislocations. No significant abnormalities are seen] : There are no fractures, subluxations or dislocations. No significant abnormalities are seen [No acute displaced fracture or dislocation] : No acute displaced fracture or dislocation [] : no erythema [de-identified] : first CMC tenderness

## 2023-08-08 NOTE — HISTORY OF PRESENT ILLNESS
[Work related] : work related [8] : 8 [3] : 3 [Dull/Aching] : dull/aching [Localized] : localized [Sharp] : sharp [Shooting] : shooting [Throbbing] : throbbing [Constant] : constant [Household chores] : household chores [Work] : work [Meds] : meds [Not working due to injury] : Work status: not working due to injury [de-identified] : Here after EMG test. Shoulder pain persist and neck pain as well. There can be tingling. he has been oout of the brace due to lack of improvement.  [] : Post Surgical Visit: no [FreeTextEntry1] : b/l hands [FreeTextEntry3] : 06/14/2019 [FreeTextEntry5] : pt presents today for EMG results follow up. states no significant change since the last visit.  [FreeTextEntry6] : Numbess in the fingers for the R hand.  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

## 2023-08-08 NOTE — DATA REVIEWED
[FreeTextEntry1] : EMG does not reveal signs of CTS. There is evidence of dysfunction coming from the neck.

## 2023-08-09 ENCOUNTER — APPOINTMENT (OUTPATIENT)
Dept: ORTHOPEDIC SURGERY | Facility: CLINIC | Age: 48
End: 2023-08-09
Payer: OTHER MISCELLANEOUS

## 2023-08-09 VITALS — WEIGHT: 155 LBS | BODY MASS INDEX: 23.49 KG/M2 | HEIGHT: 68 IN

## 2023-08-09 DIAGNOSIS — M75.02 ADHESIVE CAPSULITIS OF LEFT SHOULDER: ICD-10-CM

## 2023-08-09 PROCEDURE — 99214 OFFICE O/P EST MOD 30 MIN: CPT

## 2023-08-09 NOTE — WORK
[Partial] : partial [Does not reveal pre-existing condition(s) that may affect treatment/prognosis] : does not reveal pre-existing condition(s) that may affect treatment/prognosis [No Rx restrictions] : No Rx restrictions. [I provided the services listed above] :  I provided the services listed above.

## 2023-08-09 NOTE — DISCUSSION/SUMMARY
[de-identified] : 47m with left shoulder adhesive capsulitis. 1) resume physical therapy to work on ROM  2) cryotherapy, rest and activity modification 3) if no improvement will obtain updated MRI of the left shoulder 4) rtc 6 weeks  Entered by Keren Celestin acting as scribe. Dr. Pradhan- The documentation recorded by the scribe accurately reflects the service I personally performed and the decisions made by me.

## 2023-08-09 NOTE — DATA REVIEWED
[Left] : left [Shoulder] : shoulder [MRI] : MRI [Cervical Spine] : cervical spine [Report was reviewed and noted in the chart] : The report was reviewed and noted in the chart [I independently reviewed and interpreted images and report] : I independently reviewed and interpreted images and report [I reviewed the films/CD] : I reviewed the films/CD [FreeTextEntry1] : 01.18.22 Left shoulder - [facility] 1. The supraspinatus tendon becomes inhomogeneous and bulbous toward its anterolateral attachment site on the humerus representing tendinosis/tendinopathy where there is superimposed partial-thickness bursal surface tearing up to 1 cm in size where there is obscuring of the peritendinous fat with peritendinous edema and inflammation. 2. Fluid accumulating in the subacromial bursa representing bursitis. 3. Anterolaterally downsloping Type II acromion that abuts the underlying supraspinatus and musculotendinous junction.  01.18.22 Cervical Spine - [facility] 1. C4-5 posterior disc bulging impressing on the ventral thecal sac. 2. C6-7 left peripheral subligamentous disc bulge encroaching toward the proximal foramen. 3. Inferior heterogeneous right thyroid lesion measuring up to 1.5 cm in size. This is indeterminate and medical consultation and thyroid ultrasound is advised. 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

## 2023-08-09 NOTE — PHYSICAL EXAM
[Left] : left shoulder [] : motor and sensory intact distally [TWNoteComboBox7] : active forward flexion 90 degrees [de-identified] : active abduction 45 degrees [TWNoteComboBox9] : passive abduction 90 degrees [de-identified] : external rotation 45 degrees

## 2023-08-09 NOTE — HISTORY OF PRESENT ILLNESS
[10] : 10 [5] : 5 [Radiating] : radiating [Sharp] : sharp [Full time] : Work status: full time [de-identified] : WC DOI: 11.01.21 08/09/2023 Mr. JESSICA VALDIVIA, a 47 year old male, presents today for left shoulder. Reports that while holding a phone at work on 11.01.21 he started to expereince left sided neck pain and left shoulder pain. Had been under the care of Dr. Bennett and treated with injections and physical therapy. Also treated with NATALIE for radicular complaints.  Underwent MRIs of the left shoulder and cervical spine at [facility] which are reviewed today.  [] : Post Surgical Visit: no [FreeTextEntry1] : ZENOBIA Mcbride [FreeTextEntry3] : 11/1/21 [FreeTextEntry5] : Pt is a 48 y/o RHD M c/o neck and L shldr pain since 11/1/2021 when he felt a sharp shooting pain in his Neck/ L shldr while holding a phone at work. Pt had MRI and XR of Shldrs and C spine as a result.  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

## 2023-08-23 ENCOUNTER — TRANSCRIPTION ENCOUNTER (OUTPATIENT)
Age: 48
End: 2023-08-23

## 2023-09-05 ENCOUNTER — APPOINTMENT (OUTPATIENT)
Dept: ORTHOPEDIC SURGERY | Facility: CLINIC | Age: 48
End: 2023-09-05

## 2023-09-07 ENCOUNTER — APPOINTMENT (OUTPATIENT)
Dept: ORTHOPEDIC SURGERY | Facility: CLINIC | Age: 48
End: 2023-09-07

## 2023-09-20 ENCOUNTER — APPOINTMENT (OUTPATIENT)
Dept: ORTHOPEDIC SURGERY | Facility: CLINIC | Age: 48
End: 2023-09-20